# Patient Record
(demographics unavailable — no encounter records)

---

## 2024-12-11 NOTE — PHYSICAL EXAM
[Normal] : no jugular venous distention, supple, no lymphadenopathy and the thyroid was normal and there were no nodules present [de-identified] : Tonsil stone on right

## 2024-12-11 NOTE — HISTORY OF PRESENT ILLNESS
[FreeTextEntry8] : Acute care visit  Patient reports having a sore throat for about 3 weeks.  About 2 or 3 weeks ago he went to an urgent care in [place] at [facility] and was prescribed Z-Balwinder and Medrol Dosepak.  He states the sore throat slightly improved but continues to happen.  He states the pain is only on the right side of his throat and is continued throughout the day.  It does not worsen with swallowing food or drink.  He also reports a runny nose with postnasal drip and slight hoarse voice.  He denies fever, chills, body aches, cough, shortness of breath, sinus pressure or tenderness  His ferret  last night.  He had a lot of anxiety over the past few months around his ferret because it was sick and he was taking care of it.  He recently lost his job and may not have health insurance.  He is waiting for back pay.  He was trying to cut back on marijuana use, but smoked last night because his ferret   He was supposed to meet with our behavioral health therapist but states it fell through.  He wants help finding a therapist.  He would consider anxiety medications, but wants to hold off until he knows the status of his health insurance.  He also believes he has ADD and would like to start stimulant medication.

## 2025-03-17 NOTE — PHYSICAL EXAM
[Normal] : no acute distress, well nourished, well developed and well-appearing [Soft] : abdomen soft [Non-distended] : non-distended [No Masses] : no abdominal mass palpated [No HSM] : no HSM [Normal Bowel Sounds] : normal bowel sounds [No Hernias] : no hernias [de-identified] : LLQ tenderness

## 2025-03-17 NOTE — HISTORY OF PRESENT ILLNESS
[FreeTextEntry8] : Acute care visit reports left abdominal pain. 6/10.  constipation, straining more.  no n/v/diarrhea/heartburn   +bloating 1.5 weeks ago having pain in urethra with peeing, went away on its own.  he went skiing without trouble.   Also thinks he has another sinus infections- 2 weeks of sinus pressure.  had surgery done to sinuses for correction. he states smoking marijuana is a trigger, and he has been smoking. insists abx zpack 1 month ago,  alot of sudafed and afrin.